# Patient Record
(demographics unavailable — no encounter records)

---

## 2024-10-15 NOTE — HISTORY OF PRESENT ILLNESS
[No Pertinent Cardiac History] : no history of aortic stenosis, atrial fibrillation, coronary artery disease, recent myocardial infarction, or implantable device/pacemaker [No Pertinent Pulmonary History] : no history of asthma, COPD, sleep apnea, or smoking [No Adverse Anesthesia Reaction] : no adverse anesthesia reaction in self or family member [(Patient denies any chest pain, claudication, dyspnea on exertion, orthopnea, palpitations or syncope)] : Patient denies any chest pain, claudication, dyspnea on exertion, orthopnea, palpitations or syncope [Excellent (>10 METs)] : Excellent (>10 METs) [Chronic Anticoagulation] : no chronic anticoagulation [Chronic Kidney Disease] : no chronic kidney disease [Diabetes] : no diabetes [FreeTextEntry1] : Breast reduction and liposuction  [FreeTextEntry2] : 10/31/2024 [FreeTextEntry3] : Dr. Limon  [FreeTextEntry4] : 29-year-old female presenting for preop clearance for breast reduction and liposuction on October 31, 2024.  Patient is doing well and has no concerns today.  She denies fever, chills, nausea, vomiting, abdominal pain, chest pain, shortness of breath, palpitation or any other symptoms.